# Patient Record
Sex: MALE | URBAN - METROPOLITAN AREA
[De-identification: names, ages, dates, MRNs, and addresses within clinical notes are randomized per-mention and may not be internally consistent; named-entity substitution may affect disease eponyms.]

---

## 2022-11-10 ENCOUNTER — APPOINTMENT (OUTPATIENT)
Dept: URBAN - METROPOLITAN AREA CLINIC 197 | Age: 32
Setting detail: DERMATOLOGY
End: 2022-11-10

## 2022-11-10 DIAGNOSIS — L65.0 TELOGEN EFFLUVIUM: ICD-10-CM

## 2022-11-10 PROCEDURE — OTHER MIPS QUALITY: OTHER

## 2022-11-10 PROCEDURE — 99202 OFFICE O/P NEW SF 15 MIN: CPT

## 2022-11-10 PROCEDURE — OTHER OTHER: OTHER

## 2022-11-10 PROCEDURE — OTHER DEFER: OTHER

## 2022-11-10 PROCEDURE — OTHER COUNSELING: OTHER

## 2022-11-10 ASSESSMENT — LOCATION SIMPLE DESCRIPTION DERM: LOCATION SIMPLE: POSTERIOR SCALP

## 2022-11-10 ASSESSMENT — LOCATION ZONE DERM: LOCATION ZONE: SCALP

## 2022-11-10 ASSESSMENT — LOCATION DETAILED DESCRIPTION DERM: LOCATION DETAILED: POSTERIOR MID-PARIETAL SCALP

## 2022-11-10 NOTE — PROCEDURE: DEFER
Other Procedure: Hair loss workup
Instructions (Optional): Pt will check with transplant Dr and then get back to us if he would like workup.
X Size Of Lesion In Cm (Optional): 0
Introduction Text (Please End With A Colon): The following procedure was deferred:
Detail Level: Zone

## 2022-11-10 NOTE — PROCEDURE: OTHER
Other (Free Text): Dr. Vernon explained pt should see the Doctor that did the transplant. Pt had general anesthesia with transplant.
Note Text (......Xxx Chief Complaint.): This diagnosis correlates with the
Detail Level: Zone
Render Risk Assessment In Note?: no

## 2022-11-10 NOTE — HPI: HAIR LOSS
How Did The Hair Loss Occur?: sudden in onset
How Severe Is Your Hair Loss?: moderate
What Hair Products Do You Use?: Anti-hair loss serum
Additional History: Pt had hair transplant in [place]